# Patient Record
Sex: FEMALE | Race: OTHER | HISPANIC OR LATINO
[De-identification: names, ages, dates, MRNs, and addresses within clinical notes are randomized per-mention and may not be internally consistent; named-entity substitution may affect disease eponyms.]

---

## 2021-06-11 ENCOUNTER — APPOINTMENT (OUTPATIENT)
Dept: ANTEPARTUM | Facility: CLINIC | Age: 26
End: 2021-06-11
Payer: MEDICAID

## 2021-06-11 ENCOUNTER — ASOB RESULT (OUTPATIENT)
Age: 26
End: 2021-06-11

## 2021-06-11 PROCEDURE — 76801 OB US < 14 WKS SINGLE FETUS: CPT

## 2021-06-11 PROCEDURE — 76813 OB US NUCHAL MEAS 1 GEST: CPT

## 2021-07-25 ENCOUNTER — EMERGENCY (EMERGENCY)
Facility: HOSPITAL | Age: 26
LOS: 1 days | Discharge: HOME | End: 2021-07-25
Attending: EMERGENCY MEDICINE | Admitting: EMERGENCY MEDICINE
Payer: MEDICAID

## 2021-07-25 VITALS
SYSTOLIC BLOOD PRESSURE: 121 MMHG | DIASTOLIC BLOOD PRESSURE: 78 MMHG | RESPIRATION RATE: 18 BRPM | OXYGEN SATURATION: 100 % | HEART RATE: 94 BPM | TEMPERATURE: 98 F

## 2021-07-25 LAB
B PERT DNA SPEC QL NAA+PROBE: SIGNIFICANT CHANGE UP
C PNEUM DNA SPEC QL NAA+PROBE: SIGNIFICANT CHANGE UP
FLUAV SUBTYP SPEC NAA+PROBE: SIGNIFICANT CHANGE UP
FLUBV RNA SPEC QL NAA+PROBE: SIGNIFICANT CHANGE UP
HADV DNA SPEC QL NAA+PROBE: SIGNIFICANT CHANGE UP
HCOV 229E RNA SPEC QL NAA+PROBE: SIGNIFICANT CHANGE UP
HCOV HKU1 RNA SPEC QL NAA+PROBE: SIGNIFICANT CHANGE UP
HCOV NL63 RNA SPEC QL NAA+PROBE: SIGNIFICANT CHANGE UP
HCOV OC43 RNA SPEC QL NAA+PROBE: SIGNIFICANT CHANGE UP
HMPV RNA SPEC QL NAA+PROBE: SIGNIFICANT CHANGE UP
HPIV1 RNA SPEC QL NAA+PROBE: SIGNIFICANT CHANGE UP
HPIV2 RNA SPEC QL NAA+PROBE: SIGNIFICANT CHANGE UP
HPIV3 RNA SPEC QL NAA+PROBE: SIGNIFICANT CHANGE UP
HPIV4 RNA SPEC QL NAA+PROBE: SIGNIFICANT CHANGE UP
RAPID RVP RESULT: DETECTED
RSV RNA SPEC QL NAA+PROBE: SIGNIFICANT CHANGE UP
RV+EV RNA SPEC QL NAA+PROBE: DETECTED
SARS-COV-2 RNA SPEC QL NAA+PROBE: SIGNIFICANT CHANGE UP

## 2021-07-25 PROCEDURE — 99284 EMERGENCY DEPT VISIT MOD MDM: CPT

## 2021-07-25 RX ORDER — ALBUTEROL 90 UG/1
2.5 AEROSOL, METERED ORAL
Refills: 0 | Status: COMPLETED | OUTPATIENT
Start: 2021-07-25 | End: 2021-07-25

## 2021-07-25 RX ADMIN — ALBUTEROL 2.5 MILLIGRAM(S): 90 AEROSOL, METERED ORAL at 18:32

## 2021-07-25 RX ADMIN — Medication 125 MILLIGRAM(S): at 18:50

## 2021-07-25 NOTE — ED PROVIDER NOTE - PATIENT PORTAL LINK FT
You can access the FollowMyHealth Patient Portal offered by [facility] by registering at the following website: [email or web address]. By joining [facility]’s FollowMyHealth portal, you will also be able to view your health information using other applications (apps) compatible with our system.

## 2021-07-25 NOTE — ED PROVIDER NOTE - CLINICAL SUMMARY MEDICAL DECISION MAKING FREE TEXT BOX
26 year old female with PMH of Asthma, currently pregnant at 19 weeks gestation presents to the ED complaining of non-productive cough and runny nose for a few days. HD stable, not hypoxic. FHR by dopple 156bpm. Imp: Acute Asthma exacerbation/Viral URI. Plan for duonebs, steroids, reassess.

## 2021-07-25 NOTE — ED PROVIDER NOTE - NSFOLLOWUPINSTRUCTIONS_ED_ALL_ED_FT
Follow up with your PMD within 48-72 hrs. Show copies of your reports given to you. Take all of your medications as previously prescribed.    Take Prednisone 40mg once a day for 4 days.    Continue Albuterol nebulized treatments at home.    If you have any new, worsened or concerning symptoms, please return  to the emergency department immediately.

## 2021-07-25 NOTE — ED PROVIDER NOTE - OBJECTIVE STATEMENT
26 year old female with PMH of Asthma, currently pregnant at 19 weeks gestation presents to the ED complaining of non-productive cough and runny nose for a few days. Pt states she has been experiencing chest tightness for the past few days; tried nebulized tx at home without much relief. Denies any fevers, chills, leg pain or swelling. Denies abdominal pain, nausea, vomiting, vaginal bleeding, pelvic pain, fevers, chills or any other associated complaints. Denies prior hx of intubations, daily steroid use.

## 2021-07-25 NOTE — ED PROVIDER NOTE - PROGRESS NOTE DETAILS
SALVADOR Escoto: Pt reassessed. states she feels better, lungs clear, no exertional or speech dyspnea. Stable for dc home. PMD follow up. Rx Prednisone. Strict return precautions.

## 2021-07-25 NOTE — ED ADULT NURSE NOTE - OBJECTIVE STATEMENT
Received pt in intake 3, ambulatory, pt A&Ox4, reports approx 19 weeks pregnant c/o congestion, "cold" symptoms, cough and SOB. Reports her cold symptoms are worsening her asthma. Airway patent. Respirations even and unlabored b/l. Appears in no apparent distress. IVL 20g Angiocath placed on left AC. Labs sent. Swab sent. Will continue to monitor.

## 2021-07-25 NOTE — ED PROVIDER NOTE - ATTENDING CONTRIBUTION TO CARE
Attending note:   After face to face evaluation of this patient, I concur with above noted hx, pe, and care plan for this patient.  Magdaleno: 26 yof with asthma currently 2nd trimester. Pt complaining of chest tightness and dry cough and runny nose. No relief with nebs at home. Pt is well appearing with mild diffuse wheeze bilaterally and normal oropharynx, no resp distress.  Pt given nebs and steroids. Labs cancelled, as patient felt better after one neb and requested to po home. Pt understands return precautions.

## 2021-07-27 NOTE — ED POST DISCHARGE NOTE - DETAILS
SALVADOR Maloney: Patient called back, aware positive for enterovirus. Requesting blood work results, no evidence of lab work ordered. Requested to call back tomorrow morning at 8am when call back PA is here.

## 2021-08-03 ENCOUNTER — TRANSCRIPTION ENCOUNTER (OUTPATIENT)
Age: 26
End: 2021-08-03

## 2021-08-05 PROBLEM — Z00.00 ENCOUNTER FOR PREVENTIVE HEALTH EXAMINATION: Status: ACTIVE | Noted: 2021-08-05

## 2021-08-10 ENCOUNTER — ASOB RESULT (OUTPATIENT)
Age: 26
End: 2021-08-10

## 2021-08-10 ENCOUNTER — APPOINTMENT (OUTPATIENT)
Dept: ANTEPARTUM | Facility: CLINIC | Age: 26
End: 2021-08-10
Payer: MEDICAID

## 2021-08-10 PROCEDURE — 76811 OB US DETAILED SNGL FETUS: CPT

## 2021-12-23 ENCOUNTER — TRANSCRIPTION ENCOUNTER (OUTPATIENT)
Age: 26
End: 2021-12-23

## 2021-12-23 ENCOUNTER — INPATIENT (INPATIENT)
Facility: HOSPITAL | Age: 26
LOS: 1 days | Discharge: HOME | End: 2021-12-25
Attending: OBSTETRICS & GYNECOLOGY | Admitting: OBSTETRICS & GYNECOLOGY

## 2021-12-23 VITALS — TEMPERATURE: 98 F

## 2021-12-23 DIAGNOSIS — O26.899 OTHER SPECIFIED PREGNANCY RELATED CONDITIONS, UNSPECIFIED TRIMESTER: ICD-10-CM

## 2021-12-23 DIAGNOSIS — Z3A.00 WEEKS OF GESTATION OF PREGNANCY NOT SPECIFIED: ICD-10-CM

## 2021-12-23 DIAGNOSIS — Z98.890 OTHER SPECIFIED POSTPROCEDURAL STATES: Chronic | ICD-10-CM

## 2021-12-23 LAB
BASOPHILS # BLD AUTO: 0.04 K/UL — SIGNIFICANT CHANGE UP (ref 0–0.2)
BASOPHILS NFR BLD AUTO: 0.8 % — SIGNIFICANT CHANGE UP (ref 0–2)
BLD GP AB SCN SERPL QL: NEGATIVE — SIGNIFICANT CHANGE UP
COVID-19 SPIKE DOMAIN AB INTERP: NEGATIVE — SIGNIFICANT CHANGE UP
COVID-19 SPIKE DOMAIN ANTIBODY RESULT: 0.4 U/ML — SIGNIFICANT CHANGE UP
EOSINOPHIL # BLD AUTO: 0.26 K/UL — SIGNIFICANT CHANGE UP (ref 0–0.5)
EOSINOPHIL NFR BLD AUTO: 5 % — SIGNIFICANT CHANGE UP (ref 0–6)
HBV SURFACE AG SERPL QL IA: SIGNIFICANT CHANGE UP
HCT VFR BLD CALC: 38 % — SIGNIFICANT CHANGE UP (ref 34.5–45)
HGB BLD-MCNC: 12.1 G/DL — SIGNIFICANT CHANGE UP (ref 11.5–15.5)
IANC: 3.45 K/UL — SIGNIFICANT CHANGE UP (ref 1.5–8.5)
IMM GRANULOCYTES NFR BLD AUTO: 1.1 % — SIGNIFICANT CHANGE UP (ref 0–1.5)
LYMPHOCYTES # BLD AUTO: 1.1 K/UL — SIGNIFICANT CHANGE UP (ref 1–3.3)
LYMPHOCYTES # BLD AUTO: 21.1 % — SIGNIFICANT CHANGE UP (ref 13–44)
MCHC RBC-ENTMCNC: 28.1 PG — SIGNIFICANT CHANGE UP (ref 27–34)
MCHC RBC-ENTMCNC: 31.8 GM/DL — LOW (ref 32–36)
MCV RBC AUTO: 88.4 FL — SIGNIFICANT CHANGE UP (ref 80–100)
MONOCYTES # BLD AUTO: 0.31 K/UL — SIGNIFICANT CHANGE UP (ref 0–0.9)
MONOCYTES NFR BLD AUTO: 5.9 % — SIGNIFICANT CHANGE UP (ref 2–14)
NEUTROPHILS # BLD AUTO: 3.45 K/UL — SIGNIFICANT CHANGE UP (ref 1.8–7.4)
NEUTROPHILS NFR BLD AUTO: 66.1 % — SIGNIFICANT CHANGE UP (ref 43–77)
NRBC # BLD: 0 /100 WBCS — SIGNIFICANT CHANGE UP
NRBC # FLD: 0 K/UL — SIGNIFICANT CHANGE UP
PLATELET # BLD AUTO: 221 K/UL — SIGNIFICANT CHANGE UP (ref 150–400)
RBC # BLD: 4.3 M/UL — SIGNIFICANT CHANGE UP (ref 3.8–5.2)
RBC # FLD: 14.6 % — HIGH (ref 10.3–14.5)
RH IG SCN BLD-IMP: POSITIVE — SIGNIFICANT CHANGE UP
RH IG SCN BLD-IMP: POSITIVE — SIGNIFICANT CHANGE UP
SARS-COV-2 IGG+IGM SERPL QL IA: 0.4 U/ML — SIGNIFICANT CHANGE UP
SARS-COV-2 IGG+IGM SERPL QL IA: NEGATIVE — SIGNIFICANT CHANGE UP
SARS-COV-2 RNA SPEC QL NAA+PROBE: SIGNIFICANT CHANGE UP
T PALLIDUM AB TITR SER: NEGATIVE — SIGNIFICANT CHANGE UP
WBC # BLD: 5.22 K/UL — SIGNIFICANT CHANGE UP (ref 3.8–10.5)
WBC # FLD AUTO: 5.22 K/UL — SIGNIFICANT CHANGE UP (ref 3.8–10.5)

## 2021-12-23 RX ORDER — OXYTOCIN 10 UNIT/ML
333.33 VIAL (ML) INJECTION
Qty: 20 | Refills: 0 | Status: DISCONTINUED | OUTPATIENT
Start: 2021-12-23 | End: 2021-12-23

## 2021-12-23 RX ORDER — MOMETASONE FUROATE 220 UG/1
2 INHALANT RESPIRATORY (INHALATION)
Refills: 0 | Status: DISCONTINUED | OUTPATIENT
Start: 2021-12-23 | End: 2021-12-25

## 2021-12-23 RX ORDER — LANOLIN
1 OINTMENT (GRAM) TOPICAL EVERY 6 HOURS
Refills: 0 | Status: DISCONTINUED | OUTPATIENT
Start: 2021-12-23 | End: 2021-12-25

## 2021-12-23 RX ORDER — OXYCODONE HYDROCHLORIDE 5 MG/1
5 TABLET ORAL
Refills: 0 | Status: DISCONTINUED | OUTPATIENT
Start: 2021-12-23 | End: 2021-12-25

## 2021-12-23 RX ORDER — AER TRAVELER 0.5 G/1
1 SOLUTION RECTAL; TOPICAL EVERY 4 HOURS
Refills: 0 | Status: DISCONTINUED | OUTPATIENT
Start: 2021-12-23 | End: 2021-12-25

## 2021-12-23 RX ORDER — IBUPROFEN 200 MG
600 TABLET ORAL EVERY 6 HOURS
Refills: 0 | Status: COMPLETED | OUTPATIENT
Start: 2021-12-23 | End: 2022-11-21

## 2021-12-23 RX ORDER — CITRIC ACID/SODIUM CITRATE 300-500 MG
15 SOLUTION, ORAL ORAL EVERY 6 HOURS
Refills: 0 | Status: DISCONTINUED | OUTPATIENT
Start: 2021-12-23 | End: 2021-12-23

## 2021-12-23 RX ORDER — MORPHINE SULFATE 50 MG/1
4 CAPSULE, EXTENDED RELEASE ORAL ONCE
Refills: 0 | Status: DISCONTINUED | OUTPATIENT
Start: 2021-12-23 | End: 2021-12-23

## 2021-12-23 RX ORDER — DIPHENHYDRAMINE HCL 50 MG
25 CAPSULE ORAL EVERY 6 HOURS
Refills: 0 | Status: DISCONTINUED | OUTPATIENT
Start: 2021-12-23 | End: 2021-12-25

## 2021-12-23 RX ORDER — OXYTOCIN 10 UNIT/ML
2 VIAL (ML) INJECTION
Qty: 30 | Refills: 0 | Status: DISCONTINUED | OUTPATIENT
Start: 2021-12-23 | End: 2021-12-25

## 2021-12-23 RX ORDER — MAGNESIUM HYDROXIDE 400 MG/1
30 TABLET, CHEWABLE ORAL
Refills: 0 | Status: DISCONTINUED | OUTPATIENT
Start: 2021-12-23 | End: 2021-12-25

## 2021-12-23 RX ORDER — TETANUS TOXOID, REDUCED DIPHTHERIA TOXOID AND ACELLULAR PERTUSSIS VACCINE, ADSORBED 5; 2.5; 8; 8; 2.5 [IU]/.5ML; [IU]/.5ML; UG/.5ML; UG/.5ML; UG/.5ML
0.5 SUSPENSION INTRAMUSCULAR ONCE
Refills: 0 | Status: DISCONTINUED | OUTPATIENT
Start: 2021-12-23 | End: 2021-12-25

## 2021-12-23 RX ORDER — OXYCODONE HYDROCHLORIDE 5 MG/1
5 TABLET ORAL ONCE
Refills: 0 | Status: DISCONTINUED | OUTPATIENT
Start: 2021-12-23 | End: 2021-12-25

## 2021-12-23 RX ORDER — INFLUENZA VIRUS VACCINE 15; 15; 15; 15 UG/.5ML; UG/.5ML; UG/.5ML; UG/.5ML
0.5 SUSPENSION INTRAMUSCULAR ONCE
Refills: 0 | Status: DISCONTINUED | OUTPATIENT
Start: 2021-12-23 | End: 2021-12-25

## 2021-12-23 RX ORDER — DIBUCAINE 1 %
1 OINTMENT (GRAM) RECTAL EVERY 6 HOURS
Refills: 0 | Status: DISCONTINUED | OUTPATIENT
Start: 2021-12-23 | End: 2021-12-25

## 2021-12-23 RX ORDER — PRAMOXINE HYDROCHLORIDE 150 MG/15G
1 AEROSOL, FOAM RECTAL EVERY 4 HOURS
Refills: 0 | Status: DISCONTINUED | OUTPATIENT
Start: 2021-12-23 | End: 2021-12-25

## 2021-12-23 RX ORDER — ACETAMINOPHEN 500 MG
975 TABLET ORAL
Refills: 0 | Status: DISCONTINUED | OUTPATIENT
Start: 2021-12-23 | End: 2021-12-25

## 2021-12-23 RX ORDER — SODIUM CHLORIDE 9 MG/ML
1000 INJECTION, SOLUTION INTRAVENOUS
Refills: 0 | Status: DISCONTINUED | OUTPATIENT
Start: 2021-12-23 | End: 2021-12-23

## 2021-12-23 RX ORDER — HYDROCORTISONE 1 %
1 OINTMENT (GRAM) TOPICAL EVERY 6 HOURS
Refills: 0 | Status: DISCONTINUED | OUTPATIENT
Start: 2021-12-23 | End: 2021-12-25

## 2021-12-23 RX ORDER — ONDANSETRON 8 MG/1
4 TABLET, FILM COATED ORAL ONCE
Refills: 0 | Status: COMPLETED | OUTPATIENT
Start: 2021-12-23 | End: 2021-12-23

## 2021-12-23 RX ORDER — BECLOMETHASONE DIPROPIONATE 40 UG/1
1 AEROSOL, METERED RESPIRATORY (INHALATION)
Qty: 0 | Refills: 0 | DISCHARGE

## 2021-12-23 RX ORDER — SODIUM CHLORIDE 9 MG/ML
3 INJECTION INTRAMUSCULAR; INTRAVENOUS; SUBCUTANEOUS EVERY 8 HOURS
Refills: 0 | Status: DISCONTINUED | OUTPATIENT
Start: 2021-12-23 | End: 2021-12-25

## 2021-12-23 RX ORDER — SIMETHICONE 80 MG/1
80 TABLET, CHEWABLE ORAL EVERY 4 HOURS
Refills: 0 | Status: DISCONTINUED | OUTPATIENT
Start: 2021-12-23 | End: 2021-12-25

## 2021-12-23 RX ORDER — OXYTOCIN 10 UNIT/ML
333.33 VIAL (ML) INJECTION
Qty: 20 | Refills: 0 | Status: DISCONTINUED | OUTPATIENT
Start: 2021-12-23 | End: 2021-12-25

## 2021-12-23 RX ORDER — KETOROLAC TROMETHAMINE 30 MG/ML
30 SYRINGE (ML) INJECTION ONCE
Refills: 0 | Status: DISCONTINUED | OUTPATIENT
Start: 2021-12-23 | End: 2021-12-23

## 2021-12-23 RX ORDER — BENZOCAINE 10 %
1 GEL (GRAM) MUCOUS MEMBRANE EVERY 6 HOURS
Refills: 0 | Status: DISCONTINUED | OUTPATIENT
Start: 2021-12-23 | End: 2021-12-25

## 2021-12-23 RX ADMIN — SODIUM CHLORIDE 3 MILLILITER(S): 9 INJECTION INTRAMUSCULAR; INTRAVENOUS; SUBCUTANEOUS at 21:00

## 2021-12-23 RX ADMIN — SODIUM CHLORIDE 125 MILLILITER(S): 9 INJECTION, SOLUTION INTRAVENOUS at 13:17

## 2021-12-23 RX ADMIN — ONDANSETRON 4 MILLIGRAM(S): 8 TABLET, FILM COATED ORAL at 20:05

## 2021-12-23 RX ADMIN — Medication 0.2 MILLIGRAM(S): at 19:22

## 2021-12-23 RX ADMIN — Medication 30 MILLIGRAM(S): at 21:00

## 2021-12-23 RX ADMIN — Medication 1000 MILLIUNIT(S)/MIN: at 18:48

## 2021-12-23 RX ADMIN — Medication 30 MILLIGRAM(S): at 21:57

## 2021-12-23 RX ADMIN — Medication 2 MILLIUNIT(S)/MIN: at 14:05

## 2021-12-23 RX ADMIN — SODIUM CHLORIDE 125 MILLILITER(S): 9 INJECTION, SOLUTION INTRAVENOUS at 11:25

## 2021-12-23 NOTE — OB PROVIDER TRIAGE NOTE - NSOBPROVIDERNOTE_OBGYN_ALL_OB_FT
25 yo , EGA@40.4 weeks, presented c/o contractions every 3-10 minutes since 5 am.  Patient reports  + fetal movements, denies leaking of fluid, denies vaginal bleeding. Pt denies any other concerns.  Patient denies symptoms of COVID 19, denies recent illness, not vaccinated.  Prenatal care with Dr Lewis.  Prenatal course is uncomplicated.   GBS negative status.    OB hx: primigravida   Med hx: denies hx clotting or bleeding disorders, HTN, DM  Surg hx: denies  GYN hx: denies hx abnormal Papsmear, STIs, fibroids, polyps, cysts  Family hx: no hx congential disorders, bleeding/clotting disorders  Psych hx: denies   Social hx: denies ETOH, smoking, drugs. Safe at home/in relationship. Contraception.   Meds: PNV, QVar, BID; Albuterol, prn  No Known Allergies    – Will accept blood transfusions? Yes    T(C): 36.7 (21 @ 08:31), Max: 36.7 (21 @ 08:28)  HR: 117 (21 @ 08:35) (114 - 117)  BP: 122/77 (21 @ 08:35) (122/77 - 138/62)  RR: 18 (21 @ 08:31) (18 - 18)    – PE:   CV: RRR  Pulm: breathing comfortably on RA  Abd: gravid, nontender  Extr: moving all extremities with ease    – VE: 2/80/-2, postrior  – FHT: baseline 130, mod variability, +accels, -decels  – [place]: every 10 min  – EFW: 3000 g  – Sono: vertex    A: 26 y0, , EGA@40.4 weeks, early labor  P: admitted for pain management, will reassess for need of augmentation in 2 hours.     Patient discussed with attending physician, Dr. Carcamo.    Clara Molina CNM

## 2021-12-23 NOTE — DISCHARGE NOTE OB - MEDICATION SUMMARY - MEDICATIONS TO TAKE
I will START or STAY ON the medications listed below when I get home from the hospital:    Qvar 40 mcg/inh inhalation aerosol  -- 1 puff(s) inhaled 2 times a day  -- Indication: For Asthma

## 2021-12-23 NOTE — OB RN PATIENT PROFILE - FALL HARM RISK - UNIVERSAL INTERVENTIONS
Bed in lowest position, wheels locked, appropriate side rails in place/Call bell, personal items and telephone in reach/Instruct patient to call for assistance before getting out of bed or chair/Non-slip footwear when patient is out of bed/[place] to call system/Physically safe environment - no spills, clutter or unnecessary equipment/Purposeful Proactive Rounding/Room/bathroom lighting operational, light cord in reach

## 2021-12-23 NOTE — OB PROVIDER DELIVERY SUMMARY - NSSELHIDDEN_OBGYN_ALL_OB_FT
[NS_DeliveryAttending1_OBGYN_ALL_OB_FT:[id number]],[NS_DeliveryRN_OBGYN_ALL_OB_FT:[id number]]

## 2021-12-23 NOTE — DISCHARGE NOTE OB - HOSPITAL COURSE
Patient had nonsurgical vaginal delivery.  Please see delivery note for details.      During postpartum course patient's vitals were stable, vaginal bleeding appropriate, and pain well controlled.      On day of discharge patient is ambulating, tolerating oral intake, voiding spontaneously and her pain is controlled with oral medications. Discharge instructions and precautions were given.  Will return to office in 6 weeks for postpartum visit and may contact the office with any concerns or issues prior to that time.

## 2021-12-23 NOTE — OB RN TRIAGE NOTE - FALL HARM RISK - UNIVERSAL INTERVENTIONS
Bed in lowest position, wheels locked, appropriate side rails in place/Call bell, personal items and telephone in reach/Instruct patient to call for assistance before getting out of bed or chair/Non-slip footwear when patient is out of bed/[place] to call system/Physically safe environment - no spills, clutter or unnecessary equipment/Purposeful Proactive Rounding/Room/bathroom lighting operational, light cord in reach

## 2021-12-23 NOTE — OB PROVIDER DELIVERY SUMMARY - NSPROVIDERDELIVERYNOTE_OBGYN_ALL_OB_FT
Spontaneous vaginal delivery of liveborn infant in MARCIN position. Head delivered. No nuchal cord was noted. Shoulders and body delivered easily after. Infant was suctioned. No mec was noted. Infant was passed to mother for delayed cord clamping and skin to skin. After 1 minute, the cord was clamped and cut. Placenta delivered. Uterine fundal massage and post partum pitocin was started. Bimanual exam with removal of membranes noted. Atony improved status post methergine, additonal 20u pitocin and bimanual massage. Rectal cytotec was given. Vaginal exam was performed and noted intact cervix, vaginal walls and sulci. 2nd degree laceration and right sulcul was noted and repaired with 2.0 chromic suture. Vaginal mucosa edematous and friable.  x2 placed and tied to Richard. Count was correctx2. Pt. was stable after delivery.  EBL 450cc  Male infant, Apgars 9/9, weight 3140g

## 2021-12-23 NOTE — DISCHARGE NOTE OB - CARE PROVIDER_API CALL
Gunjan Carcamo (DO)  Obstetrics and Gynecology  [address] [place] [address]  Phone: [phone]  Fax: [phone]  Follow Up Time:

## 2021-12-23 NOTE — OB RN DELIVERY SUMMARY - NS_FORCEPSATTEMPT_OBGYN_ALL_OB
Goal Outcome Evaluation:  Plan of Care Reviewed With: patient  Progress: improving   Patient vss. Patient c/o of pain. Prn medication administered. Family at bedside. No new changes.    Forceps were not used

## 2021-12-23 NOTE — OB RN DELIVERY SUMMARY - NSSELHIDDEN_OBGYN_ALL_OB_FT
[NS_DeliveryAttending1_OBGYN_ALL_OB_FT:[id number]],[NS_DeliveryRN_OBGYN_ALL_OB_FT:[id number]==]

## 2021-12-23 NOTE — OB PROVIDER H&P - ASSESSMENT
27 yo , EGA@40.4 weeks, presented c/o contractions every 3-10 minutes since 5 am.  Patient reports  + fetal movements, denies leaking of fluid, denies vaginal bleeding. Pt denies any other concerns.  Patient denies symptoms of COVID 19, denies recent illness, not vaccinated.  Prenatal care with Dr Lewis.  Prenatal course is uncomplicated.   GBS negative status.    OB hx: primigravida   Med hx: denies hx clotting or bleeding disorders, HTN, DM  Surg hx: denies  GYN hx: denies hx abnormal Papsmear, STIs, fibroids, polyps, cysts  Family hx: no hx congential disorders, bleeding/clotting disorders  Psych hx: denies   Social hx: denies ETOH, smoking, drugs. Safe at home/in relationship. Contraception.   Meds: PNV, QVar, BID; Albuterol, prn  No Known Allergies    – Will accept blood transfusions? Yes    T(C): 36.7 (21 @ 08:31), Max: 36.7 (21 @ 08:28)  HR: 117 (21 @ 08:35) (114 - 117)  BP: 122/77 (21 @ 08:35) (122/77 - 138/62)  RR: 18 (21 @ 08:31) (18 - 18)    – PE:   CV: RRR  Pulm: breathing comfortably on RA  Abd: gravid, nontender  Extr: moving all extremities with ease    – VE: 2/80/-2, postrior  – FHT: baseline 130, mod variability, +accels, -decels  – [place]: every 10 min  – EFW: 3000 g  – Sono: vertex    A: 27 yo, , EGA@40.4 weeks, early labor; asthma  P: admitted for pain management, will reassess for need of augmentation in 2 hours.     Patient discussed with attending physician, Dr. Carcamo.    Clara Molina CNM

## 2021-12-23 NOTE — DISCHARGE NOTE OB - CARE PLAN
Assessment and plan of treatment:	After discharge, please stay on pelvic rest for 6 weeks, meaning no sexual intercourse, no tampons and no douching.   No lifting objects heavier than baby for two weeks.  Expect to have vaginal bleeding/spotting for up to six weeks.  The bleeding should get lighter and more white/light brown with time.  For bleeding soaking more than a pad an hour or passing clots greater than the size of your fist, come in to the emergency department.    Follow up in clinic in 6 weeks.   1 Principal Discharge DX:	Vaginal delivery  Assessment and plan of treatment:	After discharge, please stay on pelvic rest for 6 weeks, meaning no sexual intercourse, no tampons and no douching.   No lifting objects heavier than baby for two weeks.  Expect to have vaginal bleeding/spotting for up to six weeks.  The bleeding should get lighter and more white/light brown with time.  For bleeding soaking more than a pad an hour or passing clots greater than the size of your fist, come in to the emergency department.    Follow up in clinic in 6 weeks.

## 2021-12-23 NOTE — DISCHARGE NOTE OB - NS MD DC FALL RISK RISK
For information on Fall & Injury Prevention, visit: [email or web address] OR  [email or web address] OR  https://www.cdc.gov/steadi/patient.html

## 2021-12-23 NOTE — OB PROVIDER LABOR PROGRESS NOTE - ASSESSMENT
CNM OB Progress Note    Patient seen and evaluated at bedside.  Denies complaints.  Comfortable w/ anesthesia epidural.      T(C): 36.7 (12-23-21 @ 11:07), Max: 36.7 (12-23-21 @ 08:28)  HR: 89 (12-23-21 @ 14:26) (57 - 133)  BP: 117/63 (12-23-21 @ 14:26) (64/30 - 138/62)  RR: 16 (12-23-21 @ 11:07) (16 - 18)  SpO2: 100% (12-23-21 @ 14:21) (84% - 100%)    SVE: 5.5/90/-2    EFM: 120bpm, moderate variability, + accels, -decel  [place]:      A/P 26y P0 @40w4d admitted for labor.        -Labor: category 1 tracing    -GBS negative 12/1/21  -Analgesia: anesthesia epidural  -Augmentation with: IV Pitocin     -AROM--moderate amount of clear fluid  -Continue IV Pitocin, currently at 2U  -Patient repositioned to left lateral  -Continue to monitor with EFM & TOCO  -Recheck patient in 2-4 hours or PRN    Discussed with MD Carcamo    CNM Genet      addedum:    After AROM, pt had early decel, late decel x 1,  pt repositioned. 
CNM OB Progress Note    Patient seen and evaluated at bedside.  Denies complaints.  Comfortable w/ anesthesia epidural.  Reports pressing PCEA button PRN.    T(C): 36.7 (12-23-21 @ 11:07), Max: 36.7 (12-23-21 @ 08:28)  HR: 78 (12-23-21 @ 15:11) (57 - 133)  BP: 109/70 (12-23-21 @ 15:11) (64/30 - 138/62)  RR: 16 (12-23-21 @ 11:07) (16 - 18)  SpO2: 100% (12-23-21 @ 15:06) (84% - 100%)    SVE: 5.5/90/-2    EFM: 125bpm, moderate variability, +accels, +early decelerations, + variables  [place]:  irregular contractions    A/P 26y P0 @ 40+4wks admitted for labor.  Patient seen and evaluated for repetitive early deceleration/variables.      -Labor: Category 2 tracing, repetitive early decelerations/variables.     -GBS neg    -Pitocin turned off, last at 2U  -Repositioned to left lateral position  -Oxygen @ 10L via non-rebreather mask  -Continue to monitor with EFM & TOCO  -Recheck patient in 2-4 hours or PRN    Discussed with MD Dona De León  
25 y/o  @40+4w making change irregular ctx    For pitocin  Consider AROM in 2 hours if head lower  Epidural PRN  DW Dr Dona hernandez, NP
25yo  40w4d in labor     - continuous monitoring   - to start pushing when feel the urge  - anticipate      seen and d/w Dr. Dona Diaz PGY2

## 2021-12-23 NOTE — OB PROVIDER LABOR PROGRESS NOTE - NS_SUBJECTIVE/OBJECTIVE_OBGYN_ALL_OB_FT
f Pt examined at bedside for increased pressure     Vital Signs Last 24 Hrs  T(C): 36.4 (23 Dec 2021 15:54), Max: 36.7 (23 Dec 2021 08:28)  T(F): 97.52 (23 Dec 2021 15:54), Max: 98.1 (23 Dec 2021 08:31)  HR: 96 (23 Dec 2021 17:21) (57 - 133)  BP: 134/98 (23 Dec 2021 17:12) (64/30 - 138/62)  BP(mean): --  RR: 16 (23 Dec 2021 11:07) (16 - 18)  SpO2: 100% (23 Dec 2021 17:21) (84% - 100%)

## 2021-12-23 NOTE — OB RN DELIVERY SUMMARY - NS_SEPSISRSKCALC_OBGYN_ALL_OB_FT
EOS calculated successfully. EOS Risk Factor: 0.5/1000 live births ([facility] national incidence); GA=40w4d; Temp=98.1; ROM=4.233; GBS='Negative'; Antibiotics='No antibiotics or any antibiotics < 2 hrs prior to birth'

## 2021-12-23 NOTE — OB RN PATIENT PROFILE - INSTRUCTED TO PATIENT: IF THE INFANT IS NOT PINK DURING SKIN TO SKIN, THE PARENTS IS TO SEEK ASSISTANCE IMMEDIATELY.
Patient's right radial site was accessed. Bandage was removed and no bleeding or hematoma was present at the site. Pulse distal to the site through palation. Patient denies armor wrist pain. Patient is able to flex and extend fingers without pain or discomfort.     Statement Selected

## 2021-12-23 NOTE — DISCHARGE NOTE OB - PATIENT PORTAL LINK FT
You can access the FollowMyHealth Patient Portal offered by [facility] by registering at the following website: [email or web address]. By joining [facility]’s FollowMyHealth portal, you will also be able to view your health information using other applications (apps) compatible with our system.

## 2021-12-24 LAB
BASOPHILS # BLD AUTO: 0.04 K/UL — SIGNIFICANT CHANGE UP (ref 0–0.2)
BASOPHILS NFR BLD AUTO: 0.3 % — SIGNIFICANT CHANGE UP (ref 0–2)
EOSINOPHIL # BLD AUTO: 0.09 K/UL — SIGNIFICANT CHANGE UP (ref 0–0.5)
EOSINOPHIL NFR BLD AUTO: 0.6 % — SIGNIFICANT CHANGE UP (ref 0–6)
HCT VFR BLD CALC: 30.4 % — LOW (ref 34.5–45)
HGB BLD-MCNC: 9.9 G/DL — LOW (ref 11.5–15.5)
IANC: 11.83 K/UL — HIGH (ref 1.5–8.5)
IMM GRANULOCYTES NFR BLD AUTO: 0.5 % — SIGNIFICANT CHANGE UP (ref 0–1.5)
LYMPHOCYTES # BLD AUTO: 1.26 K/UL — SIGNIFICANT CHANGE UP (ref 1–3.3)
LYMPHOCYTES # BLD AUTO: 8.7 % — LOW (ref 13–44)
MCHC RBC-ENTMCNC: 28.1 PG — SIGNIFICANT CHANGE UP (ref 27–34)
MCHC RBC-ENTMCNC: 32.6 GM/DL — SIGNIFICANT CHANGE UP (ref 32–36)
MCV RBC AUTO: 86.4 FL — SIGNIFICANT CHANGE UP (ref 80–100)
MONOCYTES # BLD AUTO: 1.19 K/UL — HIGH (ref 0–0.9)
MONOCYTES NFR BLD AUTO: 8.2 % — SIGNIFICANT CHANGE UP (ref 2–14)
NEUTROPHILS # BLD AUTO: 11.83 K/UL — HIGH (ref 1.8–7.4)
NEUTROPHILS NFR BLD AUTO: 81.7 % — HIGH (ref 43–77)
NRBC # BLD: 0 /100 WBCS — SIGNIFICANT CHANGE UP
NRBC # FLD: 0 K/UL — SIGNIFICANT CHANGE UP
PLATELET # BLD AUTO: 164 K/UL — SIGNIFICANT CHANGE UP (ref 150–400)
RBC # BLD: 3.52 M/UL — LOW (ref 3.8–5.2)
RBC # FLD: 14.5 % — SIGNIFICANT CHANGE UP (ref 10.3–14.5)
WBC # BLD: 14.48 K/UL — HIGH (ref 3.8–10.5)
WBC # FLD AUTO: 14.48 K/UL — HIGH (ref 3.8–10.5)

## 2021-12-24 RX ORDER — IBUPROFEN 200 MG
600 TABLET ORAL EVERY 6 HOURS
Refills: 0 | Status: DISCONTINUED | OUTPATIENT
Start: 2021-12-24 | End: 2021-12-25

## 2021-12-24 RX ADMIN — Medication 600 MILLIGRAM(S): at 16:55

## 2021-12-24 RX ADMIN — Medication 975 MILLIGRAM(S): at 01:21

## 2021-12-24 RX ADMIN — Medication 600 MILLIGRAM(S): at 17:25

## 2021-12-24 RX ADMIN — Medication 975 MILLIGRAM(S): at 13:41

## 2021-12-24 RX ADMIN — OXYCODONE HYDROCHLORIDE 5 MILLIGRAM(S): 5 TABLET ORAL at 10:00

## 2021-12-24 RX ADMIN — Medication 600 MILLIGRAM(S): at 04:03

## 2021-12-24 RX ADMIN — Medication 600 MILLIGRAM(S): at 09:15

## 2021-12-24 RX ADMIN — Medication 975 MILLIGRAM(S): at 00:21

## 2021-12-24 RX ADMIN — Medication 600 MILLIGRAM(S): at 09:45

## 2021-12-24 RX ADMIN — Medication 975 MILLIGRAM(S): at 05:39

## 2021-12-24 RX ADMIN — Medication 975 MILLIGRAM(S): at 20:25

## 2021-12-24 RX ADMIN — Medication 975 MILLIGRAM(S): at 06:30

## 2021-12-24 RX ADMIN — OXYCODONE HYDROCHLORIDE 5 MILLIGRAM(S): 5 TABLET ORAL at 09:30

## 2021-12-24 RX ADMIN — Medication 600 MILLIGRAM(S): at 03:03

## 2021-12-24 RX ADMIN — SODIUM CHLORIDE 3 MILLILITER(S): 9 INJECTION INTRAMUSCULAR; INTRAVENOUS; SUBCUTANEOUS at 17:57

## 2021-12-24 RX ADMIN — Medication 975 MILLIGRAM(S): at 21:00

## 2021-12-24 RX ADMIN — SODIUM CHLORIDE 3 MILLILITER(S): 9 INJECTION INTRAMUSCULAR; INTRAVENOUS; SUBCUTANEOUS at 06:30

## 2021-12-24 RX ADMIN — Medication 975 MILLIGRAM(S): at 14:11

## 2021-12-24 NOTE — PROVIDER CONTACT NOTE (OTHER) - BACKGROUND
at 18:40, QBL of 450, patient received Im methergine, extra 20u of pit and rectal cytotec after delivery. Patient with VPx2 and barcenas to remain in for 12 hours post delivery

## 2021-12-24 NOTE — CHART NOTE - NSCHARTNOTEFT_GEN_A_CORE
Notified by RN that patient wished to speak with a doctor with questions concerning her delivery.    Patient stated that she had questions about delivery and why the baby was having seizures. Explained to patient that the team that was present at time of delivery was not currently available. Patient additionally had questions concerning the timing of fever and actions taken to prevent infection. Counseled and educated on standard treatment of intra-amniotic infection that patient received while in labor, including ampicillin, gentamicin, and tylenol. Re-iterated to patient that the delivery team that was present at the time was not available to answer questions.     Patient and support person additionally had questions concerning the cause of seizures for the baby - informed that NICU team was the primary team for the baby, and that the OBGYN team would be unable to provide full details.    JSkitu  PGY-1
R1 Eval Note    Evaluated patient for at bedside for removal of vaginal packing and barcenas.    Upon exam, bilateral labia were noted to be swollen and edematous, and bilaterally tender. Inferior aspect of perineum noted to also be swollen and skin slightly bruised and dark red. Barcenas noted to be draining clear yellow urine, vaginal packing lightly saturated. Vaginal packing x2 was removed and detached from barcenas catheter. Patient was unable to tolerated exam following removal of vaginal packing. Vaginal packing removed noted to be lightly saturated, without bleeding afterwards.    Following administration of pain medication, patient was re-examined. Bilateral labia noted to be swollen and edematous and soft, no concern for labial hematoma at this time. Appropriate bleeding noted from vaginal vault following removal of vaginal packing.    Plan:  - Reevaluate swelling at 7pm; at that time reevaluate barcenas  - Barcenas to remain in due to swelling  - Ice packs to perineum  - Tylenol/Motrin alternating and Oxycodone PRN for severe breakthrough pain    d/w Dr Lewis  JSheu PGY1

## 2021-12-24 NOTE — PROVIDER CONTACT NOTE (OTHER) - ACTION/TREATMENT ORDERED:
continue to monitor, encourage po hydration continue to monitor, encourage po hydration, no new orders at this time

## 2021-12-24 NOTE — PROGRESS NOTE ADULT - ATTENDING COMMENTS
Patient seen at bedside. s/p vaginal packing- lochia mild. Labia and mons very edematous. Richard to remain in place until tomorrow morning. continue to monitor lochia. ice packs to perineum Prn.

## 2021-12-24 NOTE — PROGRESS NOTE ADULT - ASSESSMENT
25y/o  PPD#1 from normal spontaneous vaginal delivery c/b right sulcal laceration with VPx2 and barcenas in place, s/p methergine, rectal cytotec, and 20u extra pitocin during delivery. Patient is recovering appropriately postpartum.    #Right sulcal laceration  - VPx2 and barcenas in place  - f/u AM CBC to determine time of removal of packing and barcenas    #Routine postpartum care  - Continue with PO analgesia  - Encourage ambulation when tolerated  - Continue regular diet    Merna Lopez  PGY-1

## 2021-12-24 NOTE — LACTATION INITIAL EVALUATION - LACTATION INTERVENTIONS
assisted to put baby to both breasts in football hold position with deep latch and baby noted to suck with stimulation/initiate/review safe skin-to-skin/initiate/review hand expression/initiate/review breast massage/compression/reviewed components of an effective feeding and at least 8 effective feedings per day required/reviewed importance of monitoring infant diapers, the breastfeeding log, and minimum output each day/reviewed risks of unnecessary formula supplementation/reviewed benefits and recommendations for rooming in/reviewed feeding on demand/by cue at least 8 times a day/reviewed indications of inadequate milk transfer that would require supplementation assisted to put baby to both breasts in football hold position with deep latch and baby noted to suck with stimulation/initiate/review safe skin-to-skin/initiate/review hand expression/initiate/review techniques for position and latch/initiate/review breast massage/compression/reviewed components of an effective feeding and at least 8 effective feedings per day required/reviewed importance of monitoring infant diapers, the breastfeeding log, and minimum output each day/reviewed risks of unnecessary formula supplementation/reviewed benefits and recommendations for rooming in/reviewed feeding on demand/by cue at least 8 times a day/reviewed indications of inadequate milk transfer that would require supplementation

## 2021-12-25 VITALS
OXYGEN SATURATION: 100 % | DIASTOLIC BLOOD PRESSURE: 65 MMHG | SYSTOLIC BLOOD PRESSURE: 110 MMHG | TEMPERATURE: 98 F | RESPIRATION RATE: 18 BRPM | HEART RATE: 67 BPM

## 2021-12-25 RX ADMIN — OXYCODONE HYDROCHLORIDE 5 MILLIGRAM(S): 5 TABLET ORAL at 09:00

## 2021-12-25 RX ADMIN — OXYCODONE HYDROCHLORIDE 5 MILLIGRAM(S): 5 TABLET ORAL at 09:30

## 2021-12-25 RX ADMIN — SODIUM CHLORIDE 3 MILLILITER(S): 9 INJECTION INTRAMUSCULAR; INTRAVENOUS; SUBCUTANEOUS at 16:32

## 2021-12-25 RX ADMIN — Medication 600 MILLIGRAM(S): at 12:25

## 2021-12-25 RX ADMIN — Medication 975 MILLIGRAM(S): at 16:26

## 2021-12-25 RX ADMIN — Medication 975 MILLIGRAM(S): at 16:56

## 2021-12-25 RX ADMIN — Medication 600 MILLIGRAM(S): at 05:26

## 2021-12-25 RX ADMIN — Medication 975 MILLIGRAM(S): at 09:00

## 2021-12-25 RX ADMIN — Medication 975 MILLIGRAM(S): at 08:46

## 2021-12-25 RX ADMIN — Medication 600 MILLIGRAM(S): at 12:55

## 2021-12-25 RX ADMIN — Medication 975 MILLIGRAM(S): at 02:28

## 2021-12-25 RX ADMIN — Medication 975 MILLIGRAM(S): at 03:00

## 2021-12-25 NOTE — PROGRESS NOTE ADULT - SUBJECTIVE AND OBJECTIVE BOX
OB Progress Note:  PPD#2    S: 27yo PPD#1 s/p  with 2nd degree sulcal tear s/p  X 2. EBL: 450. Barcenas left overnight due to significant bilateral swelling.  Patient pain and feel uncomfortable in the perineal area with movement.  She has oxycodone X 1 and reports relief.  Pain is well controlled. She is tolerating a regular diet and passing flatus. She is voiding spontaneously, and ambulating without difficulty. Denies CP/SOB. Denies lightheadedness/dizziness. Denies N/V.    O:  Vitals:  Vital Signs Last 24 Hrs  T(C): 36.7 (25 Dec 2021 05:45), Max: 37.1 (24 Dec 2021 17:38)  T(F): 98.1 (25 Dec 2021 05:45), Max: 98.7 (24 Dec 2021 17:38)  HR: 67 (25 Dec 2021 05:45) (65 - 76)  BP: 110/65 (25 Dec 2021 05:45) (104/64 - 118/63)  BP(mean): --  RR: 18 (25 Dec 2021 05:45) (18 - 18)  SpO2: 100% (25 Dec 2021 05:45) (99% - 100%)    MEDICATIONS  (STANDING):  acetaminophen     Tablet .. 975 milliGRAM(s) Oral <User Schedule>  diphtheria/tetanus/pertussis (acellular) Vaccine (ADAcel) 0.5 milliLiter(s) IntraMuscular once  ibuprofen  Tablet. 600 milliGRAM(s) Oral every 6 hours  influenza   Vaccine 0.5 milliLiter(s) IntraMuscular once  mometasone 220 MICROgram(s) Inhaler 2 Puff(s) Inhalation two times a day  oxytocin Infusion 333.333 milliUNIT(s)/Min (1000 mL/Hr) IV Continuous <Continuous>  oxytocin Infusion. 2 milliUNIT(s)/Min (2 mL/Hr) IV Continuous <Continuous>  prenatal multivitamin 1 Tablet(s) Oral daily  sodium chloride 0.9% lock flush 3 milliLiter(s) IV Push every 8 hours      Labs:  Blood type: A Positive  Rubella IgG: RPR: Negative                          9.9<L>   14.48<H> >-----------< 164    ( 12-24 @ 06:22 )             30.4<L>                        12.1   5.22 >-----------< 221    (  @ 09:44 )             38.0      Physical Exam:  General: NAD  Abdomen: soft, non-tender, non-distended, fundus firm  Vaginal: Lochia wnl, labia with swelling, soft to touch, no hematoma noted.  Extremities: No erythema/edema    A/P: 27yo PPD#2 s/p .  Patient is stable and doing well post-partum.   - Pain moderately controlled, continue current pain regimen  - Increase ambulation, SCDs when not ambulating  - Continue regular diet  - Continue ice packs & perineum creams to reduce swelling & pain  - To discuss with attending to remove barcenas  - Discharge planning    Eleonora CASANOVA
Patient seen at bedside  Currently PPD 2 after  with complications due to complex lacerations.  Doing well overall                           9.9    14.48 )-----------( 164      ( 24 Dec 2021 06:22 )             30.4       Vital Signs Last 24 Hrs  T(C): 36.7 (25 Dec 2021 05:45), Max: 37.1 (24 Dec 2021 17:38)  T(F): 98.1 (25 Dec 2021 05:45), Max: 98.7 (24 Dec 2021 17:38)  HR: 67 (25 Dec 2021 05:45) (65 - 76)  BP: 110/65 (25 Dec 2021 05:45) (104/64 - 118/63)  BP(mean): --  RR: 18 (25 Dec 2021 05:45) (18 - 18)  SpO2: 100% (25 Dec 2021 05:45) (99% - 100%)    labs and vitals reviewed and stable    plan for barcenas removal and trial of void, if void is normal, plan for dc later today.  if unable to void, may need consideration of leg bag for a few days at home and removal in the office.    
ANESTHESIA POST-EPIDURAL CHECK    26y Female s/p  DAY 1     No COMPLAINTS    NO APPARENT ANESTHESIA COMPLICATIONS      Irena Mcdowell CRNA
Patient seen and examined at bedside, no acute overnight events. No acute complaints, pain well controlled. Patient has not ambulated, barcenas in place, noting discomfort from barcenas. Tolerating regular diet. Denies CP, SOB, N/V, HA, blurred vision, epigastric pain. Denies lightheadedness or dizziness in bed.    Vital Signs Last 24 Hours  T(C): 36.7 (12-24-21 @ 05:52), Max: 36.9 (12-23-21 @ 20:00)  HR: 72 (12-24-21 @ 05:52) (57 - 133)  BP: 118/69 (12-24-21 @ 05:52) (64/30 - 151/68)  RR: 18 (12-24-21 @ 05:52) (16 - 20)  SpO2: 97% (12-24-21 @ 05:52) (80% - 100%)    Physical Exam:  General: NAD  Abdomen: Soft, non-tender, non-distended, fundus firm  Pelvic: Lochia wnl, external exam of perineum notable for slight swelling and barcenas with vaginal packing in place; 1/2 saturated pad pt noted not changed since last night, vaginal packing lightly saturated/light red  : barcenas in place draining clear yellow urine    Labs:    Blood Type: A Positive  Antibody Screen: --  RPR: Negative               9.9    14.48 )-----------( 164      ( 12-24 @ 06:22 )             30.4                12.1   5.22  )-----------( 221      ( 12-23 @ 09:44 )             38.0         MEDICATIONS  (STANDING):  acetaminophen     Tablet .. 975 milliGRAM(s) Oral <User Schedule>  diphtheria/tetanus/pertussis (acellular) Vaccine (ADAcel) 0.5 milliLiter(s) IntraMuscular once  ibuprofen  Tablet. 600 milliGRAM(s) Oral every 6 hours  influenza   Vaccine 0.5 milliLiter(s) IntraMuscular once  mometasone 220 MICROgram(s) Inhaler 2 Puff(s) Inhalation two times a day  oxytocin Infusion 333.333 milliUNIT(s)/Min (1000 mL/Hr) IV Continuous <Continuous>  oxytocin Infusion. 2 milliUNIT(s)/Min (2 mL/Hr) IV Continuous <Continuous>  prenatal multivitamin 1 Tablet(s) Oral daily  sodium chloride 0.9% lock flush 3 milliLiter(s) IV Push every 8 hours    MEDICATIONS  (PRN):  benzocaine 20%/menthol 0.5% Spray 1 Spray(s) Topical every 6 hours PRN for Perineal discomfort  dibucaine 1% Ointment 1 Application(s) Topical every 6 hours PRN Perineal discomfort  diphenhydrAMINE 25 milliGRAM(s) Oral every 6 hours PRN Pruritus  hydrocortisone 1% Cream 1 Application(s) Topical every 6 hours PRN Moderate Pain (4-6)  lanolin Ointment 1 Application(s) Topical every 6 hours PRN nipple soreness  magnesium hydroxide Suspension 30 milliLiter(s) Oral two times a day PRN Constipation  oxyCODONE    IR 5 milliGRAM(s) Oral every 3 hours PRN Moderate to Severe Pain (4-10)  oxyCODONE    IR 5 milliGRAM(s) Oral once PRN Moderate to Severe Pain (4-10)  pramoxine 1%/zinc 5% Cream 1 Application(s) Topical every 4 hours PRN Moderate Pain (4-6)  simethicone 80 milliGRAM(s) Chew every 4 hours PRN Gas  witch hazel Pads 1 Application(s) Topical every 4 hours PRN Perineal discomfort

## 2022-03-23 NOTE — OB PROVIDER DELIVERY SUMMARY - NSLACERATION_OBGYN_ALL_OB
Quality 110: Preventive Care And Screening: Influenza Immunization: Influenza Immunization Administered during Influenza season Detail Level: Detailed Yes

## 2022-07-07 ENCOUNTER — EMERGENCY (EMERGENCY)
Facility: HOSPITAL | Age: 27
LOS: 1 days | Discharge: HOME | End: 2022-07-07
Attending: STUDENT IN AN ORGANIZED HEALTH CARE EDUCATION/TRAINING PROGRAM
Payer: MEDICAID

## 2022-07-07 VITALS
SYSTOLIC BLOOD PRESSURE: 107 MMHG | HEIGHT: 63 IN | WEIGHT: 100.97 LBS | HEART RATE: 85 BPM | TEMPERATURE: 99 F | OXYGEN SATURATION: 98 % | RESPIRATION RATE: 19 BRPM | DIASTOLIC BLOOD PRESSURE: 73 MMHG

## 2022-07-07 DIAGNOSIS — Z98.890 OTHER SPECIFIED POSTPROCEDURAL STATES: Chronic | ICD-10-CM

## 2022-07-07 LAB
ALBUMIN SERPL ELPH-MCNC: 3.7 G/DL — SIGNIFICANT CHANGE UP (ref 3.5–5)
ALP SERPL-CCNC: 55 U/L — SIGNIFICANT CHANGE UP (ref 40–120)
ALT FLD-CCNC: 27 U/L DA — SIGNIFICANT CHANGE UP (ref 10–60)
ANION GAP SERPL CALC-SCNC: 5 MMOL/L — SIGNIFICANT CHANGE UP (ref 5–17)
ANISOCYTOSIS BLD QL: SLIGHT — SIGNIFICANT CHANGE UP
AST SERPL-CCNC: 33 U/L — SIGNIFICANT CHANGE UP (ref 10–40)
BASOPHILS # BLD AUTO: 0 K/UL — SIGNIFICANT CHANGE UP (ref 0–0.2)
BASOPHILS NFR BLD AUTO: 0 % — SIGNIFICANT CHANGE UP (ref 0–2)
BILIRUB SERPL-MCNC: 0.2 MG/DL — SIGNIFICANT CHANGE UP (ref 0.2–1.2)
BUN SERPL-MCNC: 12 MG/DL — SIGNIFICANT CHANGE UP (ref 7–18)
CALCIUM SERPL-MCNC: 9.1 MG/DL — SIGNIFICANT CHANGE UP (ref 8.4–10.5)
CHLORIDE SERPL-SCNC: 106 MMOL/L — SIGNIFICANT CHANGE UP (ref 96–108)
CO2 SERPL-SCNC: 28 MMOL/L — SIGNIFICANT CHANGE UP (ref 22–31)
CREAT SERPL-MCNC: 0.67 MG/DL — SIGNIFICANT CHANGE UP (ref 0.5–1.3)
EGFR: 124 ML/MIN/1.73M2 — SIGNIFICANT CHANGE UP
ELLIPTOCYTES BLD QL SMEAR: SLIGHT — SIGNIFICANT CHANGE UP
EOSINOPHIL # BLD AUTO: 0.08 K/UL — SIGNIFICANT CHANGE UP (ref 0–0.5)
EOSINOPHIL NFR BLD AUTO: 4 % — SIGNIFICANT CHANGE UP (ref 0–6)
GLUCOSE SERPL-MCNC: 74 MG/DL — SIGNIFICANT CHANGE UP (ref 70–99)
HCG SERPL-ACNC: <1 MIU/ML — SIGNIFICANT CHANGE UP
HCT VFR BLD CALC: 39.2 % — SIGNIFICANT CHANGE UP (ref 34.5–45)
HGB BLD-MCNC: 12.5 G/DL — SIGNIFICANT CHANGE UP (ref 11.5–15.5)
HYPOCHROMIA BLD QL: SLIGHT — SIGNIFICANT CHANGE UP
LG PLATELETS BLD QL AUTO: SLIGHT — SIGNIFICANT CHANGE UP
LYMPHOCYTES # BLD AUTO: 0.71 K/UL — LOW (ref 1–3.3)
LYMPHOCYTES # BLD AUTO: 35 % — SIGNIFICANT CHANGE UP (ref 13–44)
MANUAL SMEAR VERIFICATION: SIGNIFICANT CHANGE UP
MCHC RBC-ENTMCNC: 27.2 PG — SIGNIFICANT CHANGE UP (ref 27–34)
MCHC RBC-ENTMCNC: 31.9 GM/DL — LOW (ref 32–36)
MCV RBC AUTO: 85.4 FL — SIGNIFICANT CHANGE UP (ref 80–100)
METAMYELOCYTES # FLD: 1 % — HIGH (ref 0–0)
MICROCYTES BLD QL: SLIGHT — SIGNIFICANT CHANGE UP
MONOCYTES # BLD AUTO: 0.18 K/UL — SIGNIFICANT CHANGE UP (ref 0–0.9)
MONOCYTES NFR BLD AUTO: 9 % — SIGNIFICANT CHANGE UP (ref 2–14)
NEUTROPHILS # BLD AUTO: 1.04 K/UL — LOW (ref 1.8–7.4)
NEUTROPHILS NFR BLD AUTO: 51 % — SIGNIFICANT CHANGE UP (ref 43–77)
NRBC # BLD: 0 /100 — SIGNIFICANT CHANGE UP (ref 0–0)
PLAT MORPH BLD: NORMAL — SIGNIFICANT CHANGE UP
PLATELET # BLD AUTO: 217 K/UL — SIGNIFICANT CHANGE UP (ref 150–400)
POIKILOCYTOSIS BLD QL AUTO: SLIGHT — SIGNIFICANT CHANGE UP
POLYCHROMASIA BLD QL SMEAR: SLIGHT — SIGNIFICANT CHANGE UP
POTASSIUM SERPL-MCNC: 4 MMOL/L — SIGNIFICANT CHANGE UP (ref 3.5–5.3)
POTASSIUM SERPL-SCNC: 4 MMOL/L — SIGNIFICANT CHANGE UP (ref 3.5–5.3)
PROT SERPL-MCNC: 7.5 G/DL — SIGNIFICANT CHANGE UP (ref 6–8.3)
RBC # BLD: 4.59 M/UL — SIGNIFICANT CHANGE UP (ref 3.8–5.2)
RBC # FLD: 15.9 % — HIGH (ref 10.3–14.5)
RBC BLD AUTO: ABNORMAL
SODIUM SERPL-SCNC: 139 MMOL/L — SIGNIFICANT CHANGE UP (ref 135–145)
SPHEROCYTES BLD QL SMEAR: SLIGHT — SIGNIFICANT CHANGE UP
WBC # BLD: 2.03 K/UL — LOW (ref 3.8–10.5)
WBC # FLD AUTO: 2.03 K/UL — LOW (ref 3.8–10.5)

## 2022-07-07 PROCEDURE — 85025 COMPLETE CBC W/AUTO DIFF WBC: CPT

## 2022-07-07 PROCEDURE — 80053 COMPREHEN METABOLIC PANEL: CPT

## 2022-07-07 PROCEDURE — 36415 COLL VENOUS BLD VENIPUNCTURE: CPT

## 2022-07-07 PROCEDURE — 71045 X-RAY EXAM CHEST 1 VIEW: CPT | Mod: 26

## 2022-07-07 PROCEDURE — 71045 X-RAY EXAM CHEST 1 VIEW: CPT

## 2022-07-07 PROCEDURE — 93010 ELECTROCARDIOGRAM REPORT: CPT

## 2022-07-07 PROCEDURE — 99284 EMERGENCY DEPT VISIT MOD MDM: CPT

## 2022-07-07 PROCEDURE — 99285 EMERGENCY DEPT VISIT HI MDM: CPT | Mod: 25

## 2022-07-07 PROCEDURE — 84702 CHORIONIC GONADOTROPIN TEST: CPT

## 2022-07-07 PROCEDURE — 93005 ELECTROCARDIOGRAM TRACING: CPT

## 2022-07-07 RX ORDER — ALBUTEROL 90 UG/1
0.5 AEROSOL, METERED ORAL
Qty: 60 | Refills: 0
Start: 2022-07-07 | End: 2022-08-05

## 2022-07-07 NOTE — ED PROVIDER NOTE - CLINICAL SUMMARY MEDICAL DECISION MAKING FREE TEXT BOX
27-year-old female hx of mild asthma, COVID+ 4 days ago, presenting with SOB/CP for the past 4 days. Labs and CXR wnl. Will dc with rx for Prednisone and Albuterol.

## 2022-07-07 NOTE — ED PROVIDER NOTE - OBJECTIVE STATEMENT
27-year-old female hx of mild asthma, COVID+ 4 days ago, presenting with SOB/CP for the past 4 days. Primarily with walking. Used her Albuterol inhaler with some relief. No fevers or chills. Denies any other symptoms.

## 2022-07-07 NOTE — ED PROVIDER NOTE - NSFOLLOWUPINSTRUCTIONS_ED_ALL_ED_FT
You were seen in the emergency department for: shortness of breath  Your diagnosis for this visit was: COVID  From this ED visit you were prescribed: Albuterol, Prednisone  Your results report is attached.  We recommend you follow up with: your primary care doctor.    Please return to the Emergency Department if you experience any of the following symptoms:   - Shortness of breath or trouble breathing  - Pressure, pain or tightness in the chest  - Face drooping, arm weakness or speech difficulty  - Persistence of severe vomiting  - Head injury or loss of consciousness  - Nonstop bleeding or an open wound    (1) Follow up with your primary care physician within the next 24-48 hours as discussed. In addition, we did not find evidence of a life threatening illness on your testing here today, but listed below are the specialists that will be necessary to see as an outpatient to continue the workup.  Please call the numbers listed below or [phone] to set up the necessary appointments.  (2) Take Tylenol (up to 1000mg or 1 g)  and/or Motrin (up to 600mg) up to every 6 hours as needed for pain.   (3) If you had an IV (intravenous) line placed, it was removed. Sometimes, after IV removal, that area can be tender for a few days; if it develops redness and swelling, those could be signs of infection; in which case, return to the Emergency Department for assessment.  (4) Please continue taking all of your home medications as directed.

## 2022-07-07 NOTE — ED PROVIDER NOTE - PATIENT PORTAL LINK FT
You can access the FollowMyHealth Patient Portal offered by [facility] by registering at the following website: [email or web address]. By joining [facility]’s FollowMyHealth portal, you will also be able to view your health information using other applications (apps) compatible with our system.

## 2022-07-07 NOTE — ED PROVIDER NOTE - WET READ LAUNCH FT
There are no Wet Read(s) to document. PEM ATTENDING ADDENDUM  I personally performed a history and physical examination, and discussed the management with the resident/fellow.  The past medical and surgical history, review of systems, family history, social history, current medications, allergies, and immunization status were discussed with the trainee, and I confirmed pertinent portions with the patient and/or famil.  I made modifications above as I felt appropriate; I concur with the history as documented above unless otherwise noted below. My physical exam findings are listed below, which may differ from that documented by the trainee.  I was present for and directly supervised any procedure(s) as documented above.  I personally reviewed the labwork and imaging obtained.  I reviewed the trainee's assessment and plan and made modifications as I felt appropriate.  I agree with the assessment and plan as documented above, unless noted below.    Nelson SANTOYO

## 2022-07-08 PROBLEM — J45.909 UNSPECIFIED ASTHMA, UNCOMPLICATED: Chronic | Status: ACTIVE | Noted: 2021-12-23

## 2022-12-13 NOTE — OB RN PATIENT PROFILE - PRO INTERPRETER NEED 2
Addended by: Jannette Austin on: 12/13/2022 01:12 PM     Modules accepted: Orders
Addended by: Kyleigh Viera on: 12/13/2022 02:40 PM     Modules accepted: Orders
Addended by: Samantha Cha on: 12/13/2022 01:55 PM     Modules accepted: Orders
English

## 2024-04-02 NOTE — OB RN PATIENT PROFILE - FUNCTIONAL ASSESSMENT - BASIC MOBILITY ASSESSMENT TYPE
In an effort to ensure that our patients [employer], a Team Member has reviewed your chart and identified an opportunity to provide the best care possible. An attempt was made to discuss or schedule overdue Preventive or Disease Management screening.     The Outcome was Contact was not made, no answer/busyIf you have any questions or need help with scheduling, contact your primary care provider.. Care Gaps include Wellness Visits.    
Admission

## 2024-09-06 NOTE — ED ADULT NURSE NOTE - PAIN: BODY LOCATION
[Long Term Vascular Complications] : long term vascular complications of diabetes [Carbohydrate Consistent Diet] : carbohydrate consistent diet [Importance of Diet and Exercise] : importance of diet and exercise to improve glycemic control, achieve weight loss and improve cardiovascular health [Self Monitoring of Blood Glucose] : self monitoring of blood glucose [Retinopathy Screening] : Patient was referred to ophthalmology for retinopathy screening [Bisphosphonate Therapy] : Risks and benefits of bisphosphonate therapy were  discussed with the patient including gastroesophageal irritation, osteonecrosis of the jaw, and atypical femur fractures, and acute phase reaction [FreeTextEntry1] : Patient is a 70 yo woman with reported hx of type 2 diabetes and active osteoporosis  1. Tightly controlled Type 2 DM -POCT A1c today is 6% and at goal -discussed the risks of micro/macrovascular events including, but not limited to, heart attack/stroke/eye complications/kidney disease at length -it is unclear to me whether she had actual diagnosed diabetes. There is no A1c in the EHR consistent with diabetes though she has had prediabetes.  -on food recall, diet sharan be liberal-high in both carbohydrates, sugars and fats. Recently, she has decreased carbohydrates (i.e. bagels) and incorporating more protein and vegetables into diet -she does not check sugars -importance of self-monitoring of blood glucose also discussed. Goal fasting glucose 100-130 with 2 hour post-prandial goals < 180 -dilated eye exam required; she is overdue -monofilament testing done last visit -she continues to request ozempic and we have her on lowest dose weekly possible. Her BMI is low, she has osteoporosis and diabetes diagnosis is questionable.  As patients get older, lean/muscle mass loss is high with GLP agonists.    2. Osteoporosis -bone density reveals osteoporosis with T score of-3.0 in the spine -alendronate weekly -she feels her pill burden is a bit high and might want to consider changing to IV zoledronic acid or denosumab at some point  Due to my leave, she will continue on alendronate and discuss therapeutic change at her new endocrine visit. -denies interval falls, no fractures, tolerating alendronate without reported side effects  Information for [address] [place] [address] provided for changing care generalized chest pain
